# Patient Record
Sex: FEMALE | Race: WHITE | NOT HISPANIC OR LATINO | Employment: FULL TIME | URBAN - METROPOLITAN AREA
[De-identification: names, ages, dates, MRNs, and addresses within clinical notes are randomized per-mention and may not be internally consistent; named-entity substitution may affect disease eponyms.]

---

## 2020-10-06 ENCOUNTER — APPOINTMENT (OUTPATIENT)
Dept: GENERAL RADIOLOGY | Facility: HOSPITAL | Age: 60
End: 2020-10-06

## 2020-10-06 ENCOUNTER — HOSPITAL ENCOUNTER (EMERGENCY)
Facility: HOSPITAL | Age: 60
Discharge: HOME | End: 2020-10-06
Attending: EMERGENCY MEDICINE | Admitting: EMERGENCY MEDICINE

## 2020-10-06 VITALS
RESPIRATION RATE: 16 BRPM | OXYGEN SATURATION: 100 % | TEMPERATURE: 98.1 F | SYSTOLIC BLOOD PRESSURE: 110 MMHG | DIASTOLIC BLOOD PRESSURE: 60 MMHG | WEIGHT: 145.06 LBS | HEART RATE: 62 BPM | HEIGHT: 66 IN | BODY MASS INDEX: 23.31 KG/M2

## 2020-10-06 DIAGNOSIS — R07.9 CHEST PAIN, UNSPECIFIED TYPE: Primary | ICD-10-CM

## 2020-10-06 LAB
HOLD SPECIMEN: NORMAL
TROPONIN T SERPL-MCNC: <0.01 NG/ML (ref 0–0.03)
WHOLE BLOOD HOLD SPECIMEN: NORMAL
WHOLE BLOOD HOLD SPECIMEN: NORMAL

## 2020-10-06 PROCEDURE — 99284 EMERGENCY DEPT VISIT MOD MDM: CPT

## 2020-10-06 PROCEDURE — 84484 ASSAY OF TROPONIN QUANT: CPT | Performed by: EMERGENCY MEDICINE

## 2020-10-06 PROCEDURE — 71045 X-RAY EXAM CHEST 1 VIEW: CPT

## 2020-10-06 PROCEDURE — 93005 ELECTROCARDIOGRAM TRACING: CPT | Performed by: EMERGENCY MEDICINE

## 2020-10-06 RX ORDER — ASPIRIN 81 MG/1
81 TABLET, CHEWABLE ORAL DAILY
COMMUNITY

## 2020-10-06 RX ORDER — METOPROLOL SUCCINATE 25 MG/1
25 TABLET, EXTENDED RELEASE ORAL DAILY
COMMUNITY

## 2020-10-06 RX ORDER — SODIUM CHLORIDE 0.9 % (FLUSH) 0.9 %
10 SYRINGE (ML) INJECTION AS NEEDED
Status: DISCONTINUED | OUTPATIENT
Start: 2020-10-06 | End: 2020-10-06 | Stop reason: HOSPADM

## 2020-10-06 RX ORDER — POTASSIUM CHLORIDE 750 MG/1
10 TABLET, EXTENDED RELEASE ORAL EVERY OTHER DAY
COMMUNITY

## 2020-10-06 RX ORDER — LEVOTHYROXINE SODIUM 137 UG/1
137 TABLET ORAL DAILY
COMMUNITY

## 2020-10-06 NOTE — ED PROVIDER NOTES
Subjective   Patient is a 60-year-old female complaint of sharp pain in her chest for the past several minutes.  She has cough fever shortness of breath or other complaint.  The pain is mild without radiation.          Review of Systems  Negative for headache ears throat cough fever shortness of breath abdominal pain Bond[place] dysuria achiness weight loss or other complaint  No past medical history on file.    Allergies   Allergen Reactions   • Sulfa Antibiotics Hives       No past surgical history on file.    No family history on file.    Social History     Socioeconomic History   • Marital status:      Spouse name: Not on file   • Number of children: Not on file   • Years of education: Not on file   • Highest education level: Not on file           Objective   Physical Exam  HEENT exam shows TMs to be clear.  Oropharynx comers but sclerae nonicteric.  Neck has no adenopathy JVD or bruits.  Lungs are clear.  Heart has regular rate rhythm without murmur rub or gallop.  Chest is nontender.  Abdomen is soft nontender.  Extremity exam is no cyanosis or edema.  Procedures     My EKG interpretation shows normal sinus rhythm with no acute ST change      ED Course      Results for orders placed or performed during the hospital encounter of 10/06/20   Troponin    Specimen: Blood   Result Value Ref Range    Troponin T <0.010 0.000 - 0.030 ng/mL   Light Blue Top   Result Value Ref Range    Extra Tube hold for add-on    Lavender Top   Result Value Ref Range    Extra Tube hold for add-on    Gold Top - SST   Result Value Ref Range    Extra Tube Hold for add-ons.      Xr Chest 1 View    Result Date: 10/6/2020  Mild pulmonary hyperinflation. No acute chest findings.  Electronically Signed By-Dr. Madai Chu MD On:10/6/2020 3:20 PM This report was finalized on [id number] by Dr. Madai Chu MD.                                         MDM  Number of Diagnoses or Management Options  Diagnosis management comments: Patient  has a benign physical exam.  There is no evidence acute coronary syndrome based on EKG and troponin.  Patient has no evidence infectious process including pneumonia.  She pain-free on reexam patient will be discharged will be scheduled for an outpatient chest Myoview.  It is noted that she had a negative stress Myoview within the past few years.       Amount and/or Complexity of Data Reviewed  Clinical lab tests: reviewed  Tests in the radiology section of CPT®: reviewed  Tests in the medicine section of CPT®: reviewed    Risk of Complications, Morbidity, and/or Mortality  Presenting problems: high  Diagnostic procedures: high  Management options: high    Patient Progress  Patient progress: stable      Final diagnoses:   Chest pain, unspecified type            Anderson Cox MD  10/06/20 [id number]

## 2020-10-06 NOTE — ED NOTES
Pt c/o midsternal CP that started PTA. Sharp pain in nature. Denies SOB. Denies hx of cardiac disease. Denies nausea/vomiting/diarrhea.      Nilda Rahman RN  10/06/20 [id number]